# Patient Record
Sex: MALE | Race: BLACK OR AFRICAN AMERICAN | URBAN - METROPOLITAN AREA
[De-identification: names, ages, dates, MRNs, and addresses within clinical notes are randomized per-mention and may not be internally consistent; named-entity substitution may affect disease eponyms.]

---

## 2022-08-25 ENCOUNTER — OFFICE VISIT (OUTPATIENT)
Dept: URBAN - METROPOLITAN AREA CLINIC 80 | Facility: CLINIC | Age: 27
End: 2022-08-25
Payer: COMMERCIAL

## 2022-08-25 ENCOUNTER — DASHBOARD ENCOUNTERS (OUTPATIENT)
Age: 27
End: 2022-08-25

## 2022-08-25 ENCOUNTER — LAB OUTSIDE AN ENCOUNTER (OUTPATIENT)
Dept: URBAN - METROPOLITAN AREA CLINIC 80 | Facility: CLINIC | Age: 27
End: 2022-08-25

## 2022-08-25 ENCOUNTER — WEB ENCOUNTER (OUTPATIENT)
Dept: URBAN - METROPOLITAN AREA CLINIC 80 | Facility: CLINIC | Age: 27
End: 2022-08-25

## 2022-08-25 VITALS
HEIGHT: 70 IN | BODY MASS INDEX: 21.5 KG/M2 | SYSTOLIC BLOOD PRESSURE: 108 MMHG | HEART RATE: 52 BPM | TEMPERATURE: 97.3 F | DIASTOLIC BLOOD PRESSURE: 58 MMHG | WEIGHT: 150.2 LBS

## 2022-08-25 DIAGNOSIS — K62.5 RECTAL BLEEDING: ICD-10-CM

## 2022-08-25 DIAGNOSIS — R10.84 ABDOMINAL CRAMPING, GENERALIZED: ICD-10-CM

## 2022-08-25 PROCEDURE — 99203 OFFICE O/P NEW LOW 30 MIN: CPT | Performed by: INTERNAL MEDICINE

## 2022-08-25 RX ORDER — POLYETHYLENE GLYCOL 3350, SODIUM SULFATE, SODIUM CHLORIDE, POTASSIUM CHLORIDE, ASCORBIC ACID, SODIUM ASCORBATE 140-9-5.2G
AS DIRECTED KIT ORAL 1
Qty: 1 | Refills: 0 | OUTPATIENT
Start: 2022-08-25 | End: 2022-08-26

## 2022-08-25 RX ORDER — HYOSCYAMINE SULFATE 0.12 MG/1
1 TABLET UNDER THE TONGUE AND ALLOW TO DISSOLVE  AS NEEDED TABLET SUBLINGUAL
Qty: 30 | Refills: 2 | OUTPATIENT
Start: 2022-08-25 | End: 2022-11-22

## 2022-08-25 NOTE — HPI-TODAY'S VISIT:
Pt presents stating he has had some BRB in his stool - started about a year ago - it would come and go and has slowly gotten worse - there was on day that he had a BM and after wiping there was blood, there was blood also in the water - when it first started it was only on the tissue Normal bowel habit is 1 BM q3d - he is not seeing blood everytime - off and on No rectal pain MIld abd discomfort at times he would wake up in the middle of the night with lower abd discomort - like a hunger pain but in his lower abd - was happening almost every time he had a BM - has gotten better recently no weight loss he used to feel hemorrhoids, not right now He thinks his father has had colon polyps NO family hx of IBD he is aware of No nausea or emesis He would be scared to have a BM at times because it would cause the lower abd discomfort  He states he had blood work done last month with his PCP and everything was fine

## 2022-08-26 PROBLEM — 43364001: Status: ACTIVE | Noted: 2022-08-26

## 2022-08-26 PROBLEM — 12063002 RECTAL BLEEDING: Status: ACTIVE | Noted: 2022-08-26

## 2022-09-27 ENCOUNTER — OFFICE VISIT (OUTPATIENT)
Dept: URBAN - METROPOLITAN AREA SURGERY CENTER 19 | Facility: SURGERY CENTER | Age: 27
End: 2022-09-27

## 2022-09-28 ENCOUNTER — CLAIMS CREATED FROM THE CLAIM WINDOW (OUTPATIENT)
Dept: URBAN - METROPOLITAN AREA CLINIC 4 | Facility: CLINIC | Age: 27
End: 2022-09-28
Payer: COMMERCIAL

## 2022-09-28 ENCOUNTER — OFFICE VISIT (OUTPATIENT)
Dept: URBAN - METROPOLITAN AREA SURGERY CENTER 19 | Facility: SURGERY CENTER | Age: 27
End: 2022-09-28
Payer: COMMERCIAL

## 2022-09-28 DIAGNOSIS — K63.89 OTHER SPECIFIED DISEASES OF INTESTINE: ICD-10-CM

## 2022-09-28 DIAGNOSIS — K92.1 ACUTE MELENA: ICD-10-CM

## 2022-09-28 DIAGNOSIS — K63.89 BACTERIAL OVERGROWTH SYNDROME: ICD-10-CM

## 2022-09-28 PROCEDURE — 88305 TISSUE EXAM BY PATHOLOGIST: CPT | Performed by: PATHOLOGY

## 2022-09-28 PROCEDURE — G8907 PT DOC NO EVENTS ON DISCHARG: HCPCS | Performed by: INTERNAL MEDICINE

## 2022-09-28 PROCEDURE — 45380 COLONOSCOPY AND BIOPSY: CPT | Performed by: INTERNAL MEDICINE

## 2024-07-08 ENCOUNTER — OFFICE VISIT (OUTPATIENT)
Dept: URBAN - METROPOLITAN AREA CLINIC 80 | Facility: CLINIC | Age: 29
End: 2024-07-08
Payer: COMMERCIAL

## 2024-07-08 VITALS
TEMPERATURE: 98 F | SYSTOLIC BLOOD PRESSURE: 141 MMHG | HEIGHT: 70 IN | BODY MASS INDEX: 20.33 KG/M2 | DIASTOLIC BLOOD PRESSURE: 92 MMHG | WEIGHT: 142 LBS | HEART RATE: 66 BPM

## 2024-07-08 DIAGNOSIS — E80.4 GILBERT SYNDROME: ICD-10-CM

## 2024-07-08 DIAGNOSIS — K62.5 RECTAL BLEEDING: ICD-10-CM

## 2024-07-08 DIAGNOSIS — K64.1 GRADE II HEMORRHOIDS: ICD-10-CM

## 2024-07-08 DIAGNOSIS — K59.09 CHRONIC CONSTIPATION: ICD-10-CM

## 2024-07-08 PROBLEM — 27503000: Status: ACTIVE | Noted: 2024-07-08

## 2024-07-08 PROCEDURE — 46600 DIAGNOSTIC ANOSCOPY SPX: CPT | Performed by: INTERNAL MEDICINE

## 2024-07-08 PROCEDURE — 46221 LIGATION OF HEMORRHOID(S): CPT | Performed by: INTERNAL MEDICINE

## 2024-07-08 PROCEDURE — 99213 OFFICE O/P EST LOW 20 MIN: CPT | Performed by: INTERNAL MEDICINE

## 2024-07-08 NOTE — PHYSICAL EXAM GASTROINTESTINAL
Abdomen , soft, nontender, nondistended , no guarding or rigidity , no masses palpable , normal bowel sounds , Liver and Spleen , no hepatomegaly present , no hepatosplenomegaly , liver nontender , spleen not palpable , Rectal , normal sphincter tone , no external hemorrhoids, skin tags.  elvia with with no anal fissure.  anoscopy with grade II IH.  Left > right.  no rectal masses or bleeding present.  s/p banding of left lateral hemorrhoid today

## 2024-07-08 NOTE — HPI-TODAY'S VISIT:
[address] [facility]    [address] [place] [address]    Phone:  [phone]                                       After Visit Summary   8/16/2017    Mayra Adair    MRN: [id number]           After Visit Summary Signature Page     I have received my discharge instructions, and my questions have been answered. I have discussed any challenges I see with this plan with the nurse or doctor.    ..........................................................................................................................................  Patient/Patient Representative Signature      ..........................................................................................................................................  Patient Representative Print Name and Relationship to Patient    ..................................................               ................................................  Date                                            Time    ..........................................................................................................................................  Reviewed by Signature/Title    ...................................................              ..............................................  Date                                                            Time           Pt comes in today for hematocheiza seen for similar symptoms in 2022 had a colonoscopy in 9/28/2022 with noted IH.  no polyps.   comes in today for recurrent symptoms  he notes ongoing issues with bleeding that can last 5-7 days.  occuring monthly assoc with rectal discomfort notes 1 bm every 1-2 days bss of 4-5 occ q3 days with straining trying to eat high fiber diet and drink more water with improved stools denies any abd pain, n/v notes fatigue related to poor sleep habits related to his hemorrhoids flares often relate to constipation went to the ER at [facility] last month for the hemorrhoids ( on 5/31/2024) normal h/h at 16.7/47.9 cmp with normal alt/ast 26/24.  total bili of 2.9  no other complaints no fam hx of ibd/colon cancer

## 2024-07-22 ENCOUNTER — OFFICE VISIT (OUTPATIENT)
Dept: URBAN - METROPOLITAN AREA CLINIC 80 | Facility: CLINIC | Age: 29
End: 2024-07-22

## 2024-07-22 VITALS
HEIGHT: 70 IN | WEIGHT: 142.2 LBS | TEMPERATURE: 97.4 F | DIASTOLIC BLOOD PRESSURE: 81 MMHG | SYSTOLIC BLOOD PRESSURE: 134 MMHG | BODY MASS INDEX: 20.36 KG/M2 | HEART RATE: 53 BPM

## 2024-08-09 ENCOUNTER — OFFICE VISIT (OUTPATIENT)
Dept: URBAN - METROPOLITAN AREA CLINIC 80 | Facility: CLINIC | Age: 29
End: 2024-08-09
Payer: COMMERCIAL

## 2024-08-09 VITALS
WEIGHT: 145.4 LBS | TEMPERATURE: 97.6 F | HEIGHT: 70 IN | DIASTOLIC BLOOD PRESSURE: 84 MMHG | SYSTOLIC BLOOD PRESSURE: 143 MMHG | BODY MASS INDEX: 20.81 KG/M2 | HEART RATE: 65 BPM

## 2024-08-09 DIAGNOSIS — K64.1 GRADE II HEMORRHOIDS: ICD-10-CM

## 2024-08-09 DIAGNOSIS — K59.09 CHRONIC CONSTIPATION: ICD-10-CM

## 2024-08-09 DIAGNOSIS — E80.4 GILBERT SYNDROME: ICD-10-CM

## 2024-08-09 DIAGNOSIS — R10.84 ABDOMINAL CRAMPING, GENERALIZED: ICD-10-CM

## 2024-08-09 PROCEDURE — 99213 OFFICE O/P EST LOW 20 MIN: CPT | Performed by: INTERNAL MEDICINE

## 2024-08-09 PROCEDURE — 46221 LIGATION OF HEMORRHOID(S): CPT | Performed by: INTERNAL MEDICINE

## 2024-08-09 NOTE — HPI-TODAY'S VISIT:
Pt comes in today for continued symptomatic hemorrhoids for repeat banding seen for similar symptoms in 2022 had a colonoscopy in 9/28/2022 with noted IH.  no polyps.   had left lateral hemorrhoid banded on 7/8/2024 s/p right posterior hemorrhoid banding on 8/1/2024 previously given miralax for constipation comes in today for continued banding  he did see some blood after last banding that resolved he is noting more improved bm with miralax and after banding still with some pressure and "buzzing" sensation in the perianal area exacerbated by bm denies any rectal pain or itching no abd pain admits to  1 bm every 2 days.  bss of 4 normal appetite with no n/v stable weight  no other complaints  last labs at the ER at [facility] on 5/31/2024 with normal h/h at 16.7/47.9 cmp with normal alt/ast 26/24.  total bili of 2.9    no fam hx of ibd/colon cancer

## 2024-08-09 NOTE — PHYSICAL EXAM GASTROINTESTINAL
Abdomen , soft, nontender, nondistended , no guarding or rigidity , no masses palpable , normal bowel sounds , Liver and Spleen , no hepatomegaly present , no hepatosplenomegaly , liver nontender , spleen not palpable , Rectal , normal sphincter tone , no external hemorrhoids, skin tags.  elvia with with no anal fissure. no rectal masses or bleeding present on elvia.  s/p banding of right anterior hemorrhoid today

## 2024-08-12 ENCOUNTER — TELEPHONE ENCOUNTER (OUTPATIENT)
Dept: URBAN - METROPOLITAN AREA CLINIC 80 | Facility: CLINIC | Age: 29
End: 2024-08-12

## 2024-11-13 ENCOUNTER — LAB OUTSIDE AN ENCOUNTER (OUTPATIENT)
Dept: URBAN - METROPOLITAN AREA CLINIC 80 | Facility: CLINIC | Age: 29
End: 2024-11-13